# Patient Record
Sex: MALE | Race: WHITE | NOT HISPANIC OR LATINO
[De-identification: names, ages, dates, MRNs, and addresses within clinical notes are randomized per-mention and may not be internally consistent; named-entity substitution may affect disease eponyms.]

---

## 2018-11-02 ENCOUNTER — APPOINTMENT (OUTPATIENT)
Dept: THORACIC SURGERY | Facility: HOSPITAL | Age: 78
End: 2018-11-02
Payer: MEDICARE

## 2018-11-02 PROBLEM — Z00.00 ENCOUNTER FOR PREVENTIVE HEALTH EXAMINATION: Status: ACTIVE | Noted: 2018-11-02

## 2018-11-02 PROCEDURE — 31623 DX BRONCHOSCOPE/BRUSH: CPT

## 2018-11-23 ENCOUNTER — APPOINTMENT (OUTPATIENT)
Dept: HEMATOLOGY ONCOLOGY | Facility: CLINIC | Age: 78
End: 2018-11-23
Payer: MEDICARE

## 2018-11-23 VITALS
BODY MASS INDEX: 14.14 KG/M2 | OXYGEN SATURATION: 98 % | RESPIRATION RATE: 18 BRPM | SYSTOLIC BLOOD PRESSURE: 121 MMHG | TEMPERATURE: 98.7 F | DIASTOLIC BLOOD PRESSURE: 75 MMHG | HEART RATE: 101 BPM | WEIGHT: 88 LBS | HEIGHT: 66 IN

## 2018-11-23 DIAGNOSIS — G71.11 MYOTONIC MUSCULAR DYSTROPHY: ICD-10-CM

## 2018-11-23 DIAGNOSIS — Z87.891 PERSONAL HISTORY OF NICOTINE DEPENDENCE: ICD-10-CM

## 2018-11-23 DIAGNOSIS — Z78.9 OTHER SPECIFIED HEALTH STATUS: ICD-10-CM

## 2018-11-23 DIAGNOSIS — Z87.438 PERSONAL HISTORY OF OTHER DISEASES OF MALE GENITAL ORGANS: ICD-10-CM

## 2018-11-23 DIAGNOSIS — Z87.09 PERSONAL HISTORY OF OTHER DISEASES OF THE RESPIRATORY SYSTEM: ICD-10-CM

## 2018-11-23 DIAGNOSIS — Z86.69 PERSONAL HISTORY OF OTHER DISEASES OF THE NERVOUS SYSTEM AND SENSE ORGANS: ICD-10-CM

## 2018-11-23 DIAGNOSIS — Z86.2 PERSONAL HISTORY OF DISEASES OF THE BLOOD AND BLOOD-FORMING ORGANS AND CERTAIN DISORDERS INVOLVING THE IMMUNE MECHANISM: ICD-10-CM

## 2018-11-23 PROCEDURE — 99205 OFFICE O/P NEW HI 60 MIN: CPT

## 2018-11-30 ENCOUNTER — APPOINTMENT (OUTPATIENT)
Dept: HEMATOLOGY ONCOLOGY | Facility: CLINIC | Age: 78
End: 2018-11-30
Payer: MEDICARE

## 2018-11-30 PROCEDURE — 99214 OFFICE O/P EST MOD 30 MIN: CPT

## 2018-12-14 ENCOUNTER — APPOINTMENT (OUTPATIENT)
Dept: HEMATOLOGY ONCOLOGY | Facility: CLINIC | Age: 78
End: 2018-12-14
Payer: MEDICARE

## 2018-12-14 VITALS
DIASTOLIC BLOOD PRESSURE: 61 MMHG | BODY MASS INDEX: 13.02 KG/M2 | TEMPERATURE: 98 F | SYSTOLIC BLOOD PRESSURE: 87 MMHG | HEART RATE: 149 BPM | OXYGEN SATURATION: 92 % | WEIGHT: 81 LBS | HEIGHT: 66 IN | RESPIRATION RATE: 18 BRPM

## 2018-12-14 DIAGNOSIS — D50.9 IRON DEFICIENCY ANEMIA, UNSPECIFIED: ICD-10-CM

## 2018-12-14 DIAGNOSIS — D47.3 ESSENTIAL (HEMORRHAGIC) THROMBOCYTHEMIA: ICD-10-CM

## 2018-12-14 DIAGNOSIS — D72.829 ELEVATED WHITE BLOOD CELL COUNT, UNSPECIFIED: ICD-10-CM

## 2018-12-14 DIAGNOSIS — D47.2 MONOCLONAL GAMMOPATHY: ICD-10-CM

## 2018-12-14 PROCEDURE — 99214 OFFICE O/P EST MOD 30 MIN: CPT

## 2018-12-15 PROBLEM — Z86.69 HISTORY OF PERIPHERAL NEUROPATHY: Status: RESOLVED | Noted: 2018-12-15 | Resolved: 2018-12-15

## 2018-12-15 PROBLEM — Z78.9 NO HISTORY OF ALCOHOL USE: Status: ACTIVE | Noted: 2018-12-15

## 2018-12-15 PROBLEM — D47.3 ESSENTIAL THROMBOCYTOSIS: Status: ACTIVE | Noted: 2018-11-23

## 2018-12-15 PROBLEM — Z87.891 FORMER SMOKER: Status: ACTIVE | Noted: 2018-12-15

## 2018-12-15 PROBLEM — Z86.2 HISTORY OF ANEMIA: Status: RESOLVED | Noted: 2018-12-15 | Resolved: 2018-12-15

## 2018-12-15 PROBLEM — D47.2 ASYMPTOMATIC MONOCLONAL GAMMOPATHY: Status: ACTIVE | Noted: 2018-11-23

## 2018-12-15 PROBLEM — Z87.438 HISTORY OF BENIGN PROSTATIC HYPERPLASIA: Status: RESOLVED | Noted: 2018-12-15 | Resolved: 2018-12-15

## 2018-12-15 PROBLEM — D50.9 CHRONIC IRON DEFICIENCY ANEMIA: Status: ACTIVE | Noted: 2018-12-15

## 2018-12-15 PROBLEM — Z78.9 RESIDES IN LONG TERM CARE FACILITY: Status: ACTIVE | Noted: 2018-12-15

## 2018-12-15 PROBLEM — D72.829 LEUCOCYTOSIS: Status: ACTIVE | Noted: 2018-12-15

## 2018-12-15 PROBLEM — G71.11 MYOTONIC MUSCULAR DYSTROPHY: Status: RESOLVED | Noted: 2018-12-15 | Resolved: 2018-12-15

## 2018-12-15 PROBLEM — Z87.09 HISTORY OF CHRONIC OBSTRUCTIVE LUNG DISEASE: Status: RESOLVED | Noted: 2018-12-15 | Resolved: 2018-12-15

## 2018-12-15 PROBLEM — Z86.69 HISTORY OF HEARING LOSS: Status: RESOLVED | Noted: 2018-12-15 | Resolved: 2018-12-15

## 2018-12-15 PROBLEM — Z78.9 NON-SMOKER: Status: ACTIVE | Noted: 2018-12-15

## 2018-12-15 NOTE — REASON FOR VISIT
[Initial Consultation] : an initial consultation for [Myeloproliferative Disorder] : myeloproliferative disorder [Formal Caregiver] : formal caregiver

## 2018-12-15 NOTE — PHYSICAL EXAM
[Completely disabled. Cannot carry on any self care. Totally confined to bed or chair] : Status 4- Completely disabled. Cannot carry on any self care. Totally confined to bed or chair [Cachectic] : cachectic [Normal] : affect appropriate [de-identified] : wheelchair-bound.

## 2018-12-15 NOTE — ASSESSMENT
[FreeTextEntry1] : He appears to have an advanced myeloproliferative neoplasm at this point.\par I obtained a full hematological workup at the initial visit.  He tested positive for COLLETTE-2 and was found to be iron deficient.\par Given that his platelet count is over 900,000 but the white count has decreased into the 30,000 range I will further increase his Hydrea dose today. I have discussed this with his nurse Safia at the facility and she will increase his Hydrea to 1500 mg in the morning and 1000 mg in the evening.  He has started on oral iron at 325 mg PO BID as iron deficiency can further raise the platelet counts.\par I would like to see him back in one week for followup with labs to ensure that his counts are coming under better control. At this point he appears to be asymptomatic from these blood counts.

## 2018-12-15 NOTE — PHYSICAL EXAM
[Completely disabled. Cannot carry on any self care. Totally confined to bed or chair] : Status 4- Completely disabled. Cannot carry on any self care. Totally confined to bed or chair [Cachectic] : cachectic [Normal] : affect appropriate [de-identified] : wheelchair-bound.

## 2018-12-15 NOTE — HISTORY OF PRESENT ILLNESS
[de-identified] : This is a very pleasant 77-year-old gentleman with a below past medical history who has a long-standing history of polycythemia rubra vera and who most recently was under the care of Dr. Sheldon Brownlee.  Dr. Brownlee reported performing a bone marrow biopsy which was consistent with the above diagnosis and was COLLETTE positive showing only defect of chemotherapy. He has been maintained on Hydrea for many years. He is now being referred from his facility due to the fact that his platelet counts have jumped up nearly to 1 million. The patient reports to me that he does not believe that he is seeing a hematologist in over a year. Denies lightheadedness, new visual difficulties, new headaches, chest pain, shortness of breath, chest palpitations, new abdominal pain, signs of bleeding including melena, new lower extremity swelling or pain.  Here for f/u and labs. [de-identified] : He offers no new complaints at today's office visit. [0 - No Distress] : Distress Level: 0

## 2018-12-15 NOTE — ASSESSMENT
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 I have reviewed his peripheral smear with Marty in the hematology lab and he reports no blasts seen and mostly mature neutrophils are seen in the periphery.\par Given that his platelet count is over 900,000 and white count is over 90,000 I will increase his Hydrea dose today. I have discussed this with his nurse Padmini at the facility and she will increase his Hydrea to 1000 mg in the morning and 500 mg in the evening.\par I will also obtain a flow cytometry at the next visit if his white count is not improving.\par I would like to see him back in one week for followup with labs to ensure that his counts are coming under better control. At this point he appears to be asymptomatic from these blood counts.

## 2018-12-15 NOTE — REASON FOR VISIT
[Follow-Up Visit] : a follow-up visit for [Myeloproliferative Disorder] : myeloproliferative disorder [Formal Caregiver] : formal caregiver

## 2018-12-15 NOTE — HISTORY OF PRESENT ILLNESS
[de-identified] : This is a very pleasant 77-year-old gentleman with a below past medical history who has a long-standing history of polycythemia rubra vera and who most recently was under the care of Dr. Sheldon Brownlee.  Dr. Brownlee reported performing a bone marrow biopsy which was consistent with the above diagnosis and was COLLETTE positive showing only defect of chemotherapy. He has been maintained on Hydrea for many years. He is now being referred from his facility due to the fact that his platelet counts have jumped up nearly to 1 million. The patient reports to me that he does not believe that he is seeing a hematologist in over a year. Denies lightheadedness, new visual difficulties, new headaches, chest pain, shortness of breath, chest palpitations, new abdominal pain, signs of bleeding including melena, new lower extremity swelling or pain.

## 2018-12-15 NOTE — HISTORY OF PRESENT ILLNESS
[de-identified] : This is a very pleasant 77-year-old gentleman with a below past medical history who has a long-standing history of polycythemia rubra vera and who most recently was under the care of Dr. Sheldon Brownlee.  Dr. Brownlee reported performing a bone marrow biopsy which was consistent with the above diagnosis and was COLLETTE positive showing only defect of chemotherapy. He has been maintained on Hydrea for many years. He is now being referred from his facility due to the fact that his platelet counts have jumped up nearly to 1 million. The patient reports to me that he does not believe that he is seeing a hematologist in over a year. Denies lightheadedness, new visual difficulties, new headaches, chest pain, shortness of breath, chest palpitations, new abdominal pain, signs of bleeding including melena, new lower extremity swelling or pain.  Here for f/u and labs. [de-identified] : He offers no new complaints at today's office visit. [0 - No Distress] : Distress Level: 0

## 2018-12-15 NOTE — ASSESSMENT
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  He tested positive for COLLETTE-2 and was found to be iron deficient.\par Given that his platelet count is over 900,000 but the white count has decreased into the 60,000 range I will further increase his Hydrea dose today. I have discussed this with his nurse Safia at the facility and she will increase his Hydrea to 1000 mg in the morning and 1000 mg in the evening.  She will also start him on oral iron at 325 mg PO BID as iron deficiency can further raise the platelet counts.\par I will also obtain a flow cytometry at the next visit if his white count is not improving.\par I would like to see him back in one week for followup with labs to ensure that his counts are coming under better control. At this point he appears to be asymptomatic from these blood counts.

## 2018-12-15 NOTE — PHYSICAL EXAM
[Completely disabled. Cannot carry on any self care. Totally confined to bed or chair] : Status 4- Completely disabled. Cannot carry on any self care. Totally confined to bed or chair [Cachectic] : cachectic [Normal] : affect appropriate [de-identified] : wheelchair-bound.

## 2018-12-21 ENCOUNTER — APPOINTMENT (OUTPATIENT)
Dept: HEMATOLOGY ONCOLOGY | Facility: CLINIC | Age: 78
End: 2018-12-21